# Patient Record
Sex: MALE | Race: WHITE | NOT HISPANIC OR LATINO | Employment: FULL TIME | URBAN - NONMETROPOLITAN AREA
[De-identification: names, ages, dates, MRNs, and addresses within clinical notes are randomized per-mention and may not be internally consistent; named-entity substitution may affect disease eponyms.]

---

## 2023-02-03 NOTE — PROGRESS NOTES
"Chief Complaint  Establish Care (Discuss FMLA/) and Anxiety (Pt has taken Lorazepam, Lexapro, and Zoloft in the past. Pt states this didn't help him much./)    Subjective          Primitivo Sheehan presents to [facility]  History of Present Illness  He is here to establish care.    Anxiety/Depression: He reports that he has had depression \"all my life.\" He is currently not taking any medication. He had taken Xanax, which didn't help and made him want to stay in bed all day. He reports that Zoloft and Lexapro \"backfired in my opinion.\" He reports lowered his sex drive and made him feel like a zombie. He has tried counseling. He would like FMLA for his depression. Denies SI/HI.      Objective   Vital Signs:   /68 (BP Location: Right arm, Patient Position: Sitting)   Pulse 78   Ht 188 cm (74\")   Wt (!) 151 kg (333 lb 9.6 oz)   BMI 42.83 kg/m²     Physical Exam  Constitutional:       General: He is not in acute distress.     Appearance: Normal appearance. He is obese.   HENT:      Head: Normocephalic.   Eyes:      Pupils: Pupils are equal, round, and reactive to light.      Visual Fields: Right eye visual fields normal and left eye visual fields normal.   Neck:      Trachea: Trachea normal.   Cardiovascular:      Rate and Rhythm: Normal rate and regular rhythm.      Heart sounds: Normal heart sounds.   Pulmonary:      Effort: Pulmonary effort is normal.      Breath sounds: Normal breath sounds and air entry.   Musculoskeletal:      Right lower leg: No edema.      Left lower leg: No edema.   Skin:     General: Skin is warm and dry.   Neurological:      Mental Status: He is alert and oriented to person, place, and time.   Psychiatric:         Mood and Affect: Mood and affect normal.         Behavior: Behavior normal.         Thought Content: Thought content normal.        Result Review :   The following data was reviewed by: ADELA Mccann on 02/08/2023:              "     Assessment and Plan    Diagnoses and all orders for this visit:    1. Encounter to establish care (Primary)    2. Anxiety with depression  -     buPROPion SR (Wellbutrin SR) 150 MG 12 hr tablet; Take 1 tablet by mouth 2 (Two) Times a Day for 60 days.  Dispense: 120 tablet; Refill: 0    3. Elevated BP without diagnosis of hypertension      I am going to start him on Wellbutrin, as he has not tried that in the past.  We will have him follow-up in 6 weeks, and if he feels that he is no better, will refer him to [facility].  He may have up to 4 days a month for FMLA for 6 months.  We will recheck his blood pressure at his next appointment.    Follow Up   Return in about 6 weeks (around 3/22/2023).  Patient was given instructions and counseling regarding his condition or for health maintenance advice. Please see specific information pulled into the AVS if appropriate.

## 2023-02-08 ENCOUNTER — OFFICE VISIT (OUTPATIENT)
Dept: FAMILY MEDICINE CLINIC | Age: 23
End: 2023-02-08
Payer: COMMERCIAL

## 2023-02-08 VITALS
HEIGHT: 74 IN | BODY MASS INDEX: 40.43 KG/M2 | DIASTOLIC BLOOD PRESSURE: 68 MMHG | WEIGHT: 315 LBS | HEART RATE: 78 BPM | SYSTOLIC BLOOD PRESSURE: 143 MMHG

## 2023-02-08 DIAGNOSIS — R03.0 ELEVATED BP WITHOUT DIAGNOSIS OF HYPERTENSION: ICD-10-CM

## 2023-02-08 DIAGNOSIS — Z76.89 ENCOUNTER TO ESTABLISH CARE: Primary | ICD-10-CM

## 2023-02-08 DIAGNOSIS — F41.8 ANXIETY WITH DEPRESSION: ICD-10-CM

## 2023-02-08 PROCEDURE — 99203 OFFICE O/P NEW LOW 30 MIN: CPT | Performed by: NURSE PRACTITIONER

## 2023-02-08 RX ORDER — BUPROPION HYDROCHLORIDE 150 MG/1
150 TABLET, EXTENDED RELEASE ORAL 2 TIMES DAILY
Qty: 120 TABLET | Refills: 0 | Status: SHIPPED | OUTPATIENT
Start: 2023-02-08 | End: 2023-04-09

## 2023-02-20 ENCOUNTER — TELEPHONE (OUTPATIENT)
Dept: FAMILY MEDICINE CLINIC | Age: 23
End: 2023-02-20
Payer: COMMERCIAL

## 2023-02-20 NOTE — TELEPHONE ENCOUNTER
2/20/23 PT DROPPED OFF [facility] PAPERWORK. I FILLED IT OUT AND COLLECTED $20 FEE. LET PT KNOW WE LEGALLY HAVE 14 DAYS TO COMPLETE IT. SCANNED IT IN AND SENT TO [facility]. TEN

## 2023-03-27 PROBLEM — F41.8 ANXIETY WITH DEPRESSION: Status: ACTIVE | Noted: 2023-03-27

## 2024-05-01 ENCOUNTER — TELEPHONE (OUTPATIENT)
Dept: FAMILY MEDICINE CLINIC | Age: 24
End: 2024-05-01
Payer: COMMERCIAL

## 2024-05-01 NOTE — TELEPHONE ENCOUNTER
Patient has not been seen since 2/8/23. Patient informed that he needs an appointment for FMLA renewal, scheduled 5/6/24.

## 2024-05-08 ENCOUNTER — TELEPHONE (OUTPATIENT)
Dept: FAMILY MEDICINE CLINIC | Age: 24
End: 2024-05-08

## 2024-05-08 ENCOUNTER — OFFICE VISIT (OUTPATIENT)
Dept: FAMILY MEDICINE CLINIC | Age: 24
End: 2024-05-08
Payer: COMMERCIAL

## 2024-05-08 VITALS
WEIGHT: 273.8 LBS | SYSTOLIC BLOOD PRESSURE: 135 MMHG | HEIGHT: 74 IN | DIASTOLIC BLOOD PRESSURE: 78 MMHG | BODY MASS INDEX: 35.14 KG/M2 | HEART RATE: 71 BPM

## 2024-05-08 DIAGNOSIS — F41.8 ANXIETY WITH DEPRESSION: Primary | ICD-10-CM

## 2024-05-08 PROCEDURE — 99213 OFFICE O/P EST LOW 20 MIN: CPT | Performed by: NURSE PRACTITIONER

## 2024-06-10 NOTE — PROGRESS NOTES
"Chief Complaint  Annual Exam    Subjective          Primitivo Sheehan presents to [facility]  History of Present Illness  He is here for physical.    He believes that he has had a tetanus vaccine in the past 10 years.  He has not had the COVID-vaccine; he's not interested. He does go to the dentist. He doesn't go to the eye doctor, but needs to. He does do testicular self-exams once monthly. He does try to eat healthy. He has cut out sodas. He's trying to work on a diet. He's physically active at work. He drinks 5-6 bottles of water daily.      Objective   Vital Signs:   /84 (BP Location: Right arm, Patient Position: Sitting, Cuff Size: Large Adult)   Pulse 74   Temp 98 °F (36.7 °C) (Oral)   Ht 188 cm (74\")   Wt 127 kg (279 lb)   BMI 35.82 kg/m²     Physical Exam  Constitutional:       General: He is not in acute distress.     Appearance: Normal appearance. He is well-developed. He is obese.   HENT:      Head: Normocephalic.      Right Ear: Tympanic membrane, ear canal and external ear normal.      Left Ear: Tympanic membrane, ear canal and external ear normal.      Mouth/Throat:      Mouth: Mucous membranes are moist.      Pharynx: Oropharynx is clear. No oropharyngeal exudate or posterior oropharyngeal erythema.   Eyes:      Extraocular Movements: Extraocular movements intact.      Conjunctiva/sclera: Conjunctivae normal.      Pupils: Pupils are equal, round, and reactive to light.      Visual Fields: Right eye visual fields normal and left eye visual fields normal.   Neck:      Thyroid: No thyromegaly or thyroid tenderness.   Cardiovascular:      Rate and Rhythm: Normal rate and regular rhythm.      Heart sounds: Normal heart sounds.   Pulmonary:      Effort: Pulmonary effort is normal. No retractions.      Breath sounds: Normal breath sounds and air entry.   Abdominal:      General: Abdomen is flat. Bowel sounds are normal. There is no distension.      Palpations: Abdomen " is soft. There is no mass.      Tenderness: There is no abdominal tenderness.   Musculoskeletal:         General: Normal range of motion.      Cervical back: Normal range of motion and neck supple.      Right lower leg: No edema.      Left lower leg: No edema.   Lymphadenopathy:      Cervical: No cervical adenopathy.   Skin:     General: Skin is warm and dry.   Neurological:      Mental Status: He is alert and oriented to person, place, and time.      Gait: Gait normal.   Psychiatric:         Mood and Affect: Mood and affect normal.         Behavior: Behavior normal.         Thought Content: Thought content normal.        Result Review :   The following data was reviewed by: ADELA Mccann on 06/19/2024:                  Assessment and Plan    Diagnoses and all orders for this visit:    1. Annual physical exam (Primary)  -     Hepatitis C Antibody; Future  -     CBC (No Diff); Future  -     Comprehensive Metabolic Panel; Future  -     Lipid Panel; Future  -     Hemoglobin A1c; Future  -     TSH; Future    2. Preventative health care  -     Hepatitis C Antibody; Future  -     CBC (No Diff); Future  -     Comprehensive Metabolic Panel; Future  -     Lipid Panel; Future  -     Hemoglobin A1c; Future  -     TSH; Future    3. Screening for diabetes mellitus (DM)  -     Hemoglobin A1c; Future    4. Screening for cholesterol level  -     Lipid Panel; Future    5. Screening for thyroid disorder  -     TSH; Future    6. Need for hepatitis C screening test  -     Hepatitis C Antibody; Future    7. Obesity (BMI 30-39.9)  Assessment & Plan:  Patient's (Body mass index is 35.82 kg/m².) indicates that they are obese (BMI >30) with health conditions that include none . Weight is unchanged. BMI  is above average; BMI management plan is completed. We discussed portion control and increasing exercise.       Preventative measures discussed.  Will consider FMLA once he sees a mental health provider.      Follow Up   Return in  about 1 year (around 6/19/2025) for Annual physical.  Patient was given instructions and counseling regarding his condition or for health maintenance advice. Please see specific information pulled into the AVS if appropriate.

## 2024-06-19 ENCOUNTER — OFFICE VISIT (OUTPATIENT)
Dept: FAMILY MEDICINE CLINIC | Age: 24
End: 2024-06-19
Payer: COMMERCIAL

## 2024-06-19 VITALS
HEART RATE: 74 BPM | TEMPERATURE: 98 F | DIASTOLIC BLOOD PRESSURE: 84 MMHG | WEIGHT: 279 LBS | BODY MASS INDEX: 35.81 KG/M2 | HEIGHT: 74 IN | SYSTOLIC BLOOD PRESSURE: 139 MMHG

## 2024-06-19 DIAGNOSIS — Z00.00 PREVENTATIVE HEALTH CARE: ICD-10-CM

## 2024-06-19 DIAGNOSIS — Z13.220 SCREENING FOR CHOLESTEROL LEVEL: ICD-10-CM

## 2024-06-19 DIAGNOSIS — Z13.1 SCREENING FOR DIABETES MELLITUS (DM): ICD-10-CM

## 2024-06-19 DIAGNOSIS — Z00.00 ANNUAL PHYSICAL EXAM: Primary | ICD-10-CM

## 2024-06-19 DIAGNOSIS — Z13.29 SCREENING FOR THYROID DISORDER: ICD-10-CM

## 2024-06-19 DIAGNOSIS — Z11.59 NEED FOR HEPATITIS C SCREENING TEST: ICD-10-CM

## 2024-06-19 DIAGNOSIS — E66.9 OBESITY (BMI 30-39.9): ICD-10-CM

## 2024-06-19 PROCEDURE — 99395 PREV VISIT EST AGE 18-39: CPT | Performed by: NURSE PRACTITIONER

## 2024-06-19 NOTE — ASSESSMENT & PLAN NOTE
Patient's (Body mass index is 35.82 kg/m².) indicates that they are obese (BMI >30) with health conditions that include none . Weight is unchanged. BMI  is above average; BMI management plan is completed. We discussed portion control and increasing exercise.

## 2024-06-28 ENCOUNTER — OFFICE VISIT (OUTPATIENT)
Dept: BEHAVIORAL HEALTH | Facility: CLINIC | Age: 24
End: 2024-06-28
Payer: COMMERCIAL

## 2024-06-28 VITALS
HEIGHT: 74 IN | DIASTOLIC BLOOD PRESSURE: 86 MMHG | SYSTOLIC BLOOD PRESSURE: 122 MMHG | WEIGHT: 281.4 LBS | BODY MASS INDEX: 36.11 KG/M2 | HEART RATE: 63 BPM

## 2024-06-28 DIAGNOSIS — F41.1 GENERALIZED ANXIETY DISORDER: ICD-10-CM

## 2024-06-28 DIAGNOSIS — F12.90 MARIJUANA USE, CONTINUOUS: ICD-10-CM

## 2024-06-28 DIAGNOSIS — F33.1 MAJOR DEPRESSIVE DISORDER, RECURRENT EPISODE, MODERATE: Primary | ICD-10-CM

## 2024-06-28 DIAGNOSIS — Z79.899 MEDICATION MANAGEMENT: ICD-10-CM

## 2024-06-28 DIAGNOSIS — F43.29 STRESS AND ADJUSTMENT REACTION: ICD-10-CM

## 2024-06-28 NOTE — PATIENT INSTRUCTIONS
"1. Should you want to get in touch with your provider, ADELA Hollis, please contact MY Medical Assistant, Ernestina, directly at [phone].  Recommend saving Ernestina's direct number in phone as this is the PREFERRED & EASIEST way to get in contact with your provider.  Please leave a voice mail if you do not get an answer and she will return your call within 24 hrs. You will NOT be able to contact provider on [facility], as Behavioral Health Providers are restricted. YOU MUST CALL [phone]  If you need to speak with the on call provider after hours or on weekends, please Contact the [facility] ([phone]) and staff will be able to page the provider on call directly.     2.  In the event you need to cancel an appointment, please notify the office at least 24 hrs prior:   Contact **Ernestina Medical Assistant at [facility] directly at [phone] or the [facility] ([phone])     3. MEDICATION REFILLS:  PLEASE CALL THE PHARMACY TO REQUEST ALL MEDICATION REFILLS or via [employer] TO ENSURE YOU ARE RECEIVING YOUR MEDICATIONS IN A TIMELY MANNER. The pharmacy or [facility] will send this request ELECTRONICALLY to the ordering provider.   IF YOU USE AN AUTOMATED SERVICE AT THE PHARMACY FOR REFILLS AND ARE TOLD THERE ARE \"NO REFILLS REMAINING\"   PLEASE CALL THE PHARMACY & SPEAK TO A LIVE PERSON TO VERIFY IT IS THE MOST UP TO DATE PRESCRIPTION ON FILE.    All new prescriptions will have a different number, therefore, if you were given refills for a medication today or at last visit it will not have the same number as the previous prescription.     4.  In the event you have personal crisis, contact the following crisis numbers: Suicide Prevention Hotline 1-886.308.7326 or *988, [facility] Helpline 0-619-691-QRHP; [facility] Emergency Room [phone]; text HELLO to 338897; or 911.  If you feel like harming yourself or others, call 911 right away.  You can call the 983 Suicide and Crisis " "Lifeline at  988   to [facility], or you can connect with one using their online chat  .    5.  Never stop an antidepressant medicine without first talking to a healthcare provider. Suddenly stopping this type of medication can cause withdrawal symptoms.    6.  Counseling and talk therapy  Counseling or therapy teaches you new coping skills and more adaptive ways of thinking about problems. These tools can help you make positive changes. The benefits of counseling often last long after treatment sessions have stopped.    7.   We would appreciate your feedback, please scan the QRS code on the back of your appointment card (or see below) and complete a brief survey.  [place] location is still not available, so please click \"[place]\" location.  Thank you      SPECIFIC RECOMMENDATIONS:     1.      Medications discussed at this encounter:                   -  Start Trintellix 10 mg by mouth nightly with food (tsp of peanut butter) to target anxiety and depression.  Onset of action is usually in 2 weeks. GI symptoms can last up to 14 days, may take at night if nausea persists.      Patient informed medication may require a prior authorization (PA) from insurance company, which may delay start date of medication, as PA process can vary.  Patient would be contacted when medication has been approved if a PA is required.      2.      Psychotherapy recommendations: Declined     3.     Return to clinic: 6 weeks Friday 8/9/24 at 8am    4.  Coping mechanisms discussed with patient today as a way to gain control over feelings to prevent situation or panic attack from getting worse: grounding techniques using 5 senses (name 3 things you can see, smell, touch, etc.), slow paced breathing techniques- focus on door inhaling and exhaling at each corner, application of cold compress/ice pack/water on face or opening freezer door to allow cold air to be breathed in taking slow deep breaths, closing " "eyes and focus on positive thoughts.     Please arrive at least 15 minutes before your scheduled appointment time to complete check in process.      IF you are scheduled for a [facility] VIDEO visit, YOU MUST COMPLETE THE \"E-CHECK IN\" PROCESS PRIOR TO BEGINNING THE VISIT, YOU WILL NO LONGER RECEIVE A PHONE CALL PRIOR TO ALL VIDEO VISITS; You may still complete the E-Check in for in office visits prior to appointment, you will receive multiple text/email reminders which will direct you further if needed.           "

## 2024-06-28 NOTE — PROGRESS NOTES
"Subjective   Primitivo Sheehan is a 24 y.o. male who presents today for initial evaluation     Referring Provider:  Christine Muller, APRN  [address]  [place] [address]    Chief Complaint:  anxiety, depression, stress and adjustment reaction, marijuana daily use, medication management     Provider introduced self, as well as credentials, and informed of provider role which will primarily include medication management of mental health conditions. Explained the difference between provider role vs. therapy/counseling services which include CBT (talk therapy) and do not include management of medications which are typically 45 minutes to 1 hr in length, however, if patient was in agreement to start therapy this provider can provide a contact list and/or refer. Patient verbalized understanding and agreed to proceed.    History of Present Illness:  Patient presents today in office with a history of anxiety, depression for which treatment began in 2020.  Patient is currently prescribed no medications, last medication taken a little over 1 yr ago.  Patient denies significant PMHX.    Patient goal of treatment \"to get level headed.\"    Patient has some days of not wanting to get out of bed, as patient feels he works a lot and has a young daughter who will be 2 in 9/2024.  Patient works 2 jobs, primary job is working 3rd shift at [employer] and other is during the day for a few hours at a local "[employer].     In regards to PHQ9 screening question of thoughts of being better off dead or hurting himself/herself  in some way, patient reports  \"its more wishing the worry away, wishing I didn't have anything keeping up and worrying.\"  Denies SI, rumination, or planning and is convincing.     Patient reports lengthy history of feeling down and depressed with anxiety.  Tends to get into arguments over frivolous things, would snap at girlfriend if daughter got into something while she was " with the other kids in the home.  Will raise voice, yell for no reason or shut down and avoid girlfriend. There are times he will hold a grudge, other times will force self to get over it, and will later apologize for behavior.  Though its a vicious cycle.  Has been with girlfriend for 4 yrs.  These actions tend to worsen depression.     Patient girlfrienjosef's mother passed away 11/2023, and they took in her 2 siblings, age 13 and 17, and the 17 yr old's children, age 2 and 3. Patient and girlfriend have an 18 month old daughter together, and are living in a 2 bedroom 1 bath rental home currently, and are looking to purchase a home soon.  The siblings of girlfrienjosef have a different father whom is in [place], and girlfriend's father is in [place]. Girlfriend is age 21 and stays at home with the children, though patient reports the 17 yr old cares primarily for her 2 children.      Work stress: for almost 1 yr patient was working nearly every day, then he was unable to get any over time which was around the time when girlfriend siblings, niece and nephew moved into home last year. Financial stress due to having debt, which has improved, though stresses about getting a home with a mortgage payment. Patient is the sole provider in home.     Patient does mention discussing with PCP previously of possible bipolar disorder, though patient was informed it would be discussed at next visit.     Depression: Patient complains of depression. He complains of anhedonia, depressed mood, difficulty concentrating, fatigue, feelings of worthlessness/guilt, and insomnia     Anxiety: The patient endorses significant symptoms of anxiety including: excessive anxiety and worry about a number of events or activities for more days than not, restlessness or feeling keyed up, being easily fatigued, difficulty concentrating or mind going blank, irritability, and sleep disturbance which have caused impairment in important areas of daily functioning.   The patient has had symptoms of anxiety for several years, which have worsened over the last 6-8 months.           C-SSRS (Screen-Recent) Past Month  Q1 Wished to be Dead (Past Month): yes (06/28/24 0904)  Q2 Suicidal Thoughts (Past Month): yes (06/28/24 0904)  Q3 Suicidal Thought Method: no (06/28/24 0904)  Q4 Suicidal Intent without Specific Plan: no (06/28/24 0904)  Q5 Suicide Intent with Specific Plan: no (06/28/24 0904)  Q6 Suicide Behavior (Lifetime): no (06/28/24 0904)  Level of Risk per Screen: low risk (06/28/24 0904)    C-SSRS (Screen-Recent) Past Month  Q1 Wished to be Dead (Past Month): yes (06/28/24 0904)  Q2 Suicidal Thoughts (Past Month): yes (06/28/24 0904)  Q3 Suicidal Thought Method: no (06/28/24 0904)  Q4 Suicidal Intent without Specific Plan: no (06/28/24 0904)  Q5 Suicide Intent with Specific Plan: no (06/28/24 0904)  Q6 Suicide Behavior (Lifetime): no (06/28/24 0904)  Level of Risk per Screen: low risk (06/28/24 0904)    The following portions of the patient's history were reviewed and updated as appropriate: allergies, current medications, past family history, past medical history, past social history, and past surgical history.       PHQ-9 Depression Screening  PHQ-9 Total Score: 17     Little interest or pleasure in doing things? 1-->several days   Feeling down, depressed, or hopeless? 2-->more than half the days   Trouble falling or staying asleep, or sleeping too much? 3-->nearly every day   Feeling tired or having little energy? 3-->nearly every day   Poor appetite or overeating? 1-->several days   Feeling bad about yourself - or that you are a failure or have let yourself or your family down? 3-->nearly every day   Trouble concentrating on things, such as reading the newspaper or watching television? 1-->several days   Moving or speaking so slowly that other people could have noticed? Or the opposite - being so fidgety or restless that you have been moving around a lot more than usual?  2-->more than half the days   Thoughts that you would be better off dead, or of hurting yourself in some way? 1-->several days   PHQ-9 Total Score 17     BROWN-7  Feeling nervous, anxious or on edge: Nearly every day  Not being able to stop or control worrying: Nearly every day  Worrying too much about different things: Nearly every day  Trouble Relaxing: More than half the days  Being so restless that it is hard to sit still: More than half the days  Feeling afraid as if something awful might happen: More than half the days  Becoming easily annoyed or irritable: Nearly every day  BROWN 7 Total Score: 18  If you checked any problems, how difficult have these problems made it for you to do your work, take care of things at home, or get along with other people: Very difficult    Past Surgical History:  Past Surgical History:   Procedure Laterality Date    TONSILLECTOMY         Problem List:  Patient Active Problem List   Diagnosis    Anxiety with depression    Obesity (BMI 30-39.9)       Allergy:   No Known Allergies     Discontinued Medications:  There are no discontinued medications.    Current Medications:   Current Outpatient Medications   Medication Sig Dispense Refill    Vortioxetine HBr (Trintellix) 10 MG tablet tablet Take 1 tablet by mouth Every Night. TAKE WITH FOOD  Indications: Major Depressive Disorder 30 tablet 1     No current facility-administered medications for this visit.       Past Medical History:  Past Medical History:   Diagnosis Date    Anxiety     Depression     Obesity        Past Psychiatric History:  Began Treatment: 2020  Diagnoses:Depression and Anxiety  Psychiatrist:Denies  Therapist:Denies  Admission History:Denies  Medication Trials: Wellbutrin  mg twice daily 2/8/23-took for 3-4 months,though only given 60 day supply, self stopped due to ineffectiveness, mood swings, & did not follow up; Xanax- excessive drowsiness; Zoloft, Lexapro- [id number] uncertain of duration of therapy-both  decreased libido, felt zombie like  Patient denies trials of combinations.  Self Harm: Denies  Suicide Attempts:Denies      Substance Abuse History: As of 23  Types: smokes marijuana daily since , 2gm/day or 2 joints/day  Withdrawal Symptoms:Denies  Longest Period Sober:Not Applicable   AA: No  Legal: denies    Social History: As of 23  Martial Status:Single  Employed:Yes and If so, where [id number] full time; [employer] part time   Kids:Yes or If so, how many 1 daughter will be 2 in 2024  House:Lives in a house with girlfriend, daughter, her 2 siblings (17,13), and her niece and nephew (age 2,3) 7 total people in home since 2023   History: Denies  Access to Guns:  No    Social History     Socioeconomic History    Marital status: Significant Other    Number of children: 1    Highest education level: High school graduate   Tobacco Use    Smoking status: Former     Current packs/day: 0.00     Average packs/day: 1 pack/day for 4.0 years (4.0 ttl pk-yrs)     Types: Cigarettes     Start date:      Quit date: 2017     Years since quittin.4    Smokeless tobacco: Never   Vaping Use    Vaping status: Some Days    Substances: Nicotine, Flavoring    Devices: Pre-filled pod   Substance and Sexual Activity    Alcohol use: Yes     Comment: occasionally    Drug use: Yes     Types: Marijuana     Comment: Smokes Marijuana daily    Sexual activity: Yes     Partners: Female     Birth control/protection: Condom       Family History:   Suicide Attempts: Denies  Suicide Completions:Denies      Family History   Problem Relation Age of Onset    Alcohol abuse Mother     Alcohol abuse Father     OCD Maternal Grandmother     Dementia Maternal Grandmother        Developmental History:   Born: [place]  Siblings:1 sister lives in [place]   Childhood: Denies Abuse  High School:Completed  College:Denies    Mental Status Exam:   Hygiene:   good  Cooperation:  Cooperative  Eye Contact:   "Good  Psychomotor Behavior:  Appropriate  Affect:   flat  Mood: depressed and anxious  Speech:  Normal  Thought Process:  Goal directed  Thought Content:  Mood congruent  Suicidal:  None  Homicidal:  None  Hallucinations:  None  Delusion:  None  Memory:  Intact  Orientation:  Grossly intact  Reliability:  good  Insight:  Good  Judgement:  Good  Impulse Control:  Good  Physical/Medical Issues:  No      Review of Systems:  Review of Systems   Constitutional:  Positive for fatigue.   Neurological:  Negative for seizures.   Psychiatric/Behavioral:  Positive for agitation, decreased concentration and sleep disturbance. Negative for hallucinations, self-injury and suicidal ideas. The patient is nervous/anxious. The patient is not hyperactive.          Physical Exam:  Physical Exam  Psychiatric:         Attention and Perception: Attention and perception normal.         Mood and Affect: Mood is anxious and depressed. Affect is flat.         Speech: Speech normal.         Behavior: Behavior normal. Behavior is cooperative.         Thought Content: Thought content normal. Thought content does not include suicidal ideation. Thought content does not include suicidal plan.         Cognition and Memory: Cognition and memory normal.         Judgment: Judgment normal.         Vital Signs:   /86   Pulse 63   Ht 188 cm (74\")   Wt 128 kg (281 lb 6.4 oz)   BMI 36.13 kg/m²      Lab Results:   No visits with results within 6 Month(s) from this visit.   Latest known visit with results is:   No results found for any previous visit.       EKG Results:  No orders to display       Imaging Results:  No Images in the past 120 days found..      Assessment & Plan   Diagnoses and all orders for this visit:    1. Major depressive disorder, recurrent episode, moderate (Primary)  -     Vortioxetine HBr (Trintellix) 10 MG tablet tablet; Take 1 tablet by mouth Every Night. TAKE WITH FOOD  Indications: Major Depressive Disorder  Dispense: 30 " tablet; Refill: 1    2. Generalized anxiety disorder  -     Vortioxetine HBr (Trintellix) 10 MG tablet tablet; Take 1 tablet by mouth Every Night. TAKE WITH FOOD  Indications: Major Depressive Disorder  Dispense: 30 tablet; Refill: 1    3. Stress and adjustment reaction    4. Marijuana use, continuous    5. Medication management        Visit Diagnoses:    ICD-10-CM ICD-9-CM   1. Major depressive disorder, recurrent episode, moderate  F33.1 296.32   2. Generalized anxiety disorder  F41.1 300.02   3. Stress and adjustment reaction  F43.29 309.89   4. Marijuana use, continuous  F12.90 305.21   5. Medication management  Z79.899 V58.69       PLAN:  Safety: No acute safety concerns  Therapy: Declines   Patient educated and encouraged to start therapy to develop new coping skills and more adaptive ways of thinking about problems. These tools can help make positive changes. The benefits of counseling often last long after treatment sessions have stopped.  Risk Assessment: Risk of self-harm acutely is low.  Risk factors include anxiety disorder and mood disorder.  Protective factors include no family history, denies access to guns/weapons, no present SI, no history of suicide attempts or self-harm in the past, minimal AODA, healthcare seeking, future orientation, willingness to engage in care.  Risk of self-harm chronically is also low, but could be further elevated in the event of treatment noncompliance and/or AODA.  Meds:  Start Trintellix 10 mg by mouth daily/nightly with food, suggested 1 tsp of peanut butter, to target anxiety and depression.  Risks, benefits, alternatives discussed with patient including nausea, vomiting, constipation, sexual dysfunction, increased risk of bleeding especially when combined with anticoagulants (NSAIDS, blood thinners), when combined with triptans could theoretically cause weakness, hyperreflexia, and incoordination, caution with history of seizures.  Onset of action is usually in 2  weeks. GI symptoms can last up to 14 days, may take at night if nausea persists.  After discussion of these risks and benefits, the patient voiced understanding and agreed to proceed.   Patient informed medication may require a prior authorization (PA) from insurance company, which may delay start date of medication, as PA process can vary.  Patient would be contacted when medication has been approved if a PA is required.    Labs: n/a  Advised patient to sign up for text alerts with current pharmacy, which will inform patient when a medication is ready, cost of medication, and when a refill is needed.  Patient reports he is NOT currently enrolled.    Patient informed if a medication is requested via telephone to office, MyChart, or with pharmacy directly, to check with their pharmacy (via phone, michelle) or [facility] to check status of those requests before contacting the office.     Coping mechanisms discussed with patient today as a way to gain control over feelings to prevent situation or panic attack from getting worse: grounding techniques using 5 senses (name 3 things you can see, smell, touch, etc.), slow paced breathing techniques- focus on door inhaling and exhaling at each corner, application of cold compress/ice pack/water on face or opening freezer door to allow cold air to be breathed in taking slow deep breaths, closing eyes and focus on positive thoughts.   Discussed and given patient the following education materials:  -Grounding Techniques, Cognitive distortions, 5 ways to defuse anxious thoughts, and What is Mindfulness with tips printout given to patient, provided by [employer] -How to Stop Over thinking Tips and coping strategies print out given to patient today from [facility].      Patient presentation seems most consistent with anxiety, depression, stress and adjustment reaction, and daily marijuana use.  Discussed starting Effexor XR or Cymbalta though due to risk of withdrawal symptoms  with inconsistent timing of doses due to patient working 3rd shift, will proceed with Trintellix.   Patient has taken on care of his girlfriend siblings and niece and nephew along with their child has caused a significant level of stress financially and impacted mood.  Discussed various behavioral modification strategies today.     The plan was discussed with the patient. The patient was given time to ask questions and these questions were answered. At the conclusion of their visit they had no additional questions or concerns and all questions were answered to their satisfaction.   Patient was given instructions and counseling regarding condition and for health maintenance advice. Please see specific information pulled into the AVS if appropriate.   Patient to contact provider if symptoms worsen or fail to improve.        Patient screened positive for depression based on a PHQ-9 score of 17 on 6/28/2024. Follow-up recommendations include: Prescribed antidepressant medication treatment and Suicide Risk Assessment performed.       TREATMENT PLAN/GOALS: Continue supportive psychotherapy efforts and medications as indicated. Treatment and medication options discussed during today's visit. Patient acknowledged and verbally consented to continue with current treatment plan and was educated on the importance of compliance with treatment and follow-up appointments.    MEDICATION ISSUES:  ISAEL reviewed as expected.  Discussed medication options and treatment plan of prescribed medication as well as the risks, benefits, and side effects including potential falls, possible impaired driving and metabolic adversities among others. Patient is agreeable to call the office with any worsening of symptoms or onset of side effects. Patient is agreeable to call 911 or go to the nearest ER should he/she begin having SI/HI. No medication side effects or related complaints today.     MEDS ORDERED DURING VISIT:  New Medications Ordered  This Visit   Medications    Vortioxetine HBr (Trintellix) 10 MG tablet tablet     Sig: Take 1 tablet by mouth Every Night. TAKE WITH FOOD  Indications: Major Depressive Disorder     Dispense:  30 tablet     Refill:  1     FAILED Zoloft, Lexapro, Wellbutrin RxBin [id number] RxPCN [place] Rxgrp: [id number] Issuer ([id number]) ID: [id number]       Return in about 6 weeks (around 8/9/2024) for medication check.         I spent 71 minutes caring for Primitivo on this date of service. This time includes time spent by me in the following activities: preparing for the visit, reviewing tests, obtaining and/or reviewing a separately obtained history, performing a medically appropriate examination and/or evaluation, counseling and educating the patient/family/caregiver, ordering medications, tests, or procedures, referring and communicating with other health care professionals, documenting information in the medical record, care coordination, and scheduling .      This document has been electronically signed by ADELA Hollis  June 28, 2024 10:13 EDT           Part of this note may be an electronic transcription/translation of spoken language to printed text using the Dragon Dictation System.

## 2024-07-24 ENCOUNTER — TELEPHONE (OUTPATIENT)
Dept: FAMILY MEDICINE CLINIC | Age: 24
End: 2024-07-24
Payer: COMMERCIAL

## 2024-08-05 ENCOUNTER — LAB (OUTPATIENT)
Dept: LAB | Facility: HOSPITAL | Age: 24
End: 2024-08-05
Payer: COMMERCIAL

## 2024-08-05 DIAGNOSIS — Z13.29 SCREENING FOR THYROID DISORDER: ICD-10-CM

## 2024-08-05 DIAGNOSIS — Z00.00 ANNUAL PHYSICAL EXAM: ICD-10-CM

## 2024-08-05 DIAGNOSIS — Z13.220 SCREENING FOR CHOLESTEROL LEVEL: ICD-10-CM

## 2024-08-05 DIAGNOSIS — Z13.1 SCREENING FOR DIABETES MELLITUS (DM): ICD-10-CM

## 2024-08-05 DIAGNOSIS — Z11.59 NEED FOR HEPATITIS C SCREENING TEST: ICD-10-CM

## 2024-08-05 DIAGNOSIS — Z00.00 PREVENTATIVE HEALTH CARE: ICD-10-CM

## 2024-08-05 LAB
ALBUMIN SERPL-MCNC: 4.4 G/DL (ref 3.5–5.2)
ALBUMIN/GLOB SERPL: 1.4 G/DL
ALP SERPL-CCNC: 70 U/L (ref 39–117)
ALT SERPL W P-5'-P-CCNC: 23 U/L (ref 1–41)
ANION GAP SERPL CALCULATED.3IONS-SCNC: 12.3 MMOL/L (ref 5–15)
AST SERPL-CCNC: 23 U/L (ref 1–40)
BILIRUB SERPL-MCNC: 0.4 MG/DL (ref 0–1.2)
BUN SERPL-MCNC: 17 MG/DL (ref 6–20)
BUN/CREAT SERPL: 14.3 (ref 7–25)
CALCIUM SPEC-SCNC: 9.6 MG/DL (ref 8.6–10.5)
CHLORIDE SERPL-SCNC: 101 MMOL/L (ref 98–107)
CHOLEST SERPL-MCNC: 154 MG/DL (ref 0–200)
CO2 SERPL-SCNC: 25.7 MMOL/L (ref 22–29)
CREAT SERPL-MCNC: 1.19 MG/DL (ref 0.76–1.27)
DEPRECATED RDW RBC AUTO: 39.7 FL (ref 37–54)
EGFRCR SERPLBLD CKD-EPI 2021: 86.9 ML/MIN/1.73
ERYTHROCYTE [DISTWIDTH] IN BLOOD BY AUTOMATED COUNT: 12.1 % (ref 12.3–15.4)
GLOBULIN UR ELPH-MCNC: 3.2 GM/DL
GLUCOSE SERPL-MCNC: 88 MG/DL (ref 65–99)
HBA1C MFR BLD: 5 % (ref 4.8–5.6)
HCT VFR BLD AUTO: 45.4 % (ref 37.5–51)
HCV AB SER QL: NORMAL
HDLC SERPL-MCNC: 39 MG/DL (ref 40–60)
HGB BLD-MCNC: 15 G/DL (ref 13–17.7)
LDLC SERPL CALC-MCNC: 92 MG/DL (ref 0–100)
LDLC/HDLC SERPL: 2.29 {RATIO}
MCH RBC QN AUTO: 29.5 PG (ref 26.6–33)
MCHC RBC AUTO-ENTMCNC: 33 G/DL (ref 31.5–35.7)
MCV RBC AUTO: 89.2 FL (ref 79–97)
PLATELET # BLD AUTO: 248 10*3/MM3 (ref 140–450)
PMV BLD AUTO: 9.3 FL (ref 6–12)
POTASSIUM SERPL-SCNC: 4.6 MMOL/L (ref 3.5–5.2)
PROT SERPL-MCNC: 7.6 G/DL (ref 6–8.5)
RBC # BLD AUTO: 5.09 10*6/MM3 (ref 4.14–5.8)
SODIUM SERPL-SCNC: 139 MMOL/L (ref 136–145)
TRIGL SERPL-MCNC: 128 MG/DL (ref 0–150)
TSH SERPL DL<=0.05 MIU/L-ACNC: 1.68 UIU/ML (ref 0.27–4.2)
VLDLC SERPL-MCNC: 23 MG/DL (ref 5–40)
WBC NRBC COR # BLD AUTO: 8.37 10*3/MM3 (ref 3.4–10.8)

## 2024-08-05 PROCEDURE — 83036 HEMOGLOBIN GLYCOSYLATED A1C: CPT

## 2024-08-05 PROCEDURE — 80061 LIPID PANEL: CPT

## 2024-08-05 PROCEDURE — 86803 HEPATITIS C AB TEST: CPT

## 2024-08-05 PROCEDURE — 36415 COLL VENOUS BLD VENIPUNCTURE: CPT

## 2024-08-05 PROCEDURE — 80050 GENERAL HEALTH PANEL: CPT

## 2024-08-13 ENCOUNTER — TELEPHONE (OUTPATIENT)
Dept: FAMILY MEDICINE CLINIC | Age: 24
End: 2024-08-13
Payer: COMMERCIAL

## 2024-08-13 NOTE — TELEPHONE ENCOUNTER
Pt was going to be called due to the appt that was no showed on 8/12/2024 but he had already rescheduled the appt. This is his second documented no show of this calendar year. A letter will be sent to inform him of the no show policy.